# Patient Record
Sex: FEMALE | Race: WHITE | NOT HISPANIC OR LATINO | Employment: FULL TIME | URBAN - METROPOLITAN AREA
[De-identification: names, ages, dates, MRNs, and addresses within clinical notes are randomized per-mention and may not be internally consistent; named-entity substitution may affect disease eponyms.]

---

## 2023-10-13 ENCOUNTER — OFFICE VISIT (OUTPATIENT)
Dept: INTERNAL MEDICINE | Facility: CLINIC | Age: 38
End: 2023-10-13
Payer: COMMERCIAL

## 2023-10-13 ENCOUNTER — LAB VISIT (OUTPATIENT)
Dept: LAB | Facility: HOSPITAL | Age: 38
End: 2023-10-13
Attending: FAMILY MEDICINE
Payer: COMMERCIAL

## 2023-10-13 VITALS
HEART RATE: 108 BPM | DIASTOLIC BLOOD PRESSURE: 80 MMHG | BODY MASS INDEX: 21.87 KG/M2 | TEMPERATURE: 97 F | WEIGHT: 123.44 LBS | RESPIRATION RATE: 20 BRPM | SYSTOLIC BLOOD PRESSURE: 122 MMHG | HEIGHT: 63 IN | OXYGEN SATURATION: 99 %

## 2023-10-13 DIAGNOSIS — M25.511 ACUTE PAIN OF RIGHT SHOULDER: ICD-10-CM

## 2023-10-13 DIAGNOSIS — R25.3 MUSCLE TWITCHING: ICD-10-CM

## 2023-10-13 DIAGNOSIS — R42 DIZZINESS: Primary | ICD-10-CM

## 2023-10-13 LAB
ALBUMIN SERPL BCP-MCNC: 4.3 G/DL (ref 3.5–5.2)
ALP SERPL-CCNC: 48 U/L (ref 55–135)
ALT SERPL W/O P-5'-P-CCNC: 14 U/L (ref 10–44)
ANION GAP SERPL CALC-SCNC: 10 MMOL/L (ref 8–16)
AST SERPL-CCNC: 14 U/L (ref 10–40)
BILIRUB SERPL-MCNC: 0.4 MG/DL (ref 0.1–1)
BUN SERPL-MCNC: 10 MG/DL (ref 6–20)
CALCIUM SERPL-MCNC: 9.4 MG/DL (ref 8.7–10.5)
CHLORIDE SERPL-SCNC: 102 MMOL/L (ref 95–110)
CO2 SERPL-SCNC: 24 MMOL/L (ref 23–29)
CREAT SERPL-MCNC: 0.7 MG/DL (ref 0.5–1.4)
EST. GFR  (NO RACE VARIABLE): >60 ML/MIN/1.73 M^2
GLUCOSE SERPL-MCNC: 86 MG/DL (ref 70–110)
POTASSIUM SERPL-SCNC: 3.6 MMOL/L (ref 3.5–5.1)
PROT SERPL-MCNC: 7 G/DL (ref 6–8.4)
SODIUM SERPL-SCNC: 136 MMOL/L (ref 136–145)

## 2023-10-13 PROCEDURE — 36415 COLL VENOUS BLD VENIPUNCTURE: CPT | Mod: PO | Performed by: FAMILY MEDICINE

## 2023-10-13 PROCEDURE — 3008F BODY MASS INDEX DOCD: CPT | Mod: CPTII,S$GLB,, | Performed by: FAMILY MEDICINE

## 2023-10-13 PROCEDURE — 3079F DIAST BP 80-89 MM HG: CPT | Mod: CPTII,S$GLB,, | Performed by: FAMILY MEDICINE

## 2023-10-13 PROCEDURE — 99203 PR OFFICE/OUTPT VISIT, NEW, LEVL III, 30-44 MIN: ICD-10-PCS | Mod: S$GLB,,, | Performed by: FAMILY MEDICINE

## 2023-10-13 PROCEDURE — 99203 OFFICE O/P NEW LOW 30 MIN: CPT | Mod: S$GLB,,, | Performed by: FAMILY MEDICINE

## 2023-10-13 PROCEDURE — 3074F PR MOST RECENT SYSTOLIC BLOOD PRESSURE < 130 MM HG: ICD-10-PCS | Mod: CPTII,S$GLB,, | Performed by: FAMILY MEDICINE

## 2023-10-13 PROCEDURE — 3008F PR BODY MASS INDEX (BMI) DOCUMENTED: ICD-10-PCS | Mod: CPTII,S$GLB,, | Performed by: FAMILY MEDICINE

## 2023-10-13 PROCEDURE — 3074F SYST BP LT 130 MM HG: CPT | Mod: CPTII,S$GLB,, | Performed by: FAMILY MEDICINE

## 2023-10-13 PROCEDURE — 1159F PR MEDICATION LIST DOCUMENTED IN MEDICAL RECORD: ICD-10-PCS | Mod: CPTII,S$GLB,, | Performed by: FAMILY MEDICINE

## 2023-10-13 PROCEDURE — 99999 PR PBB SHADOW E&M-NEW PATIENT-LVL IV: CPT | Mod: PBBFAC,,, | Performed by: FAMILY MEDICINE

## 2023-10-13 PROCEDURE — 1159F MED LIST DOCD IN RCRD: CPT | Mod: CPTII,S$GLB,, | Performed by: FAMILY MEDICINE

## 2023-10-13 PROCEDURE — 99999 PR PBB SHADOW E&M-NEW PATIENT-LVL IV: ICD-10-PCS | Mod: PBBFAC,,, | Performed by: FAMILY MEDICINE

## 2023-10-13 PROCEDURE — 80053 COMPREHEN METABOLIC PANEL: CPT | Performed by: FAMILY MEDICINE

## 2023-10-13 PROCEDURE — 3079F PR MOST RECENT DIASTOLIC BLOOD PRESSURE 80-89 MM HG: ICD-10-PCS | Mod: CPTII,S$GLB,, | Performed by: FAMILY MEDICINE

## 2023-10-13 RX ORDER — BUPROPION HYDROCHLORIDE 300 MG/1
300 TABLET ORAL DAILY
COMMUNITY

## 2023-10-13 RX ORDER — THYROID 30 MG/1
TABLET ORAL NIGHTLY
COMMUNITY

## 2023-10-15 NOTE — PROGRESS NOTES
"Subjective:      Patient ID: Monse Sheridan is a 38 y.o. female.    Chief Complaint: Establish Care      Patient here to establish care.   Reviewed recent labs from earlier this year today with the patient.   She reports episode of sharp pain in right shoulder, no injury or trauma, lasted about 5 minutes then completely resolved.   She has had intermittent twitching in right lower face, this has only been about a week.   Reports also intermittent dizziness, often happens with movement of her head,denies spinning sensation but sometimes associated with nausea, sometimes happens when driving and she moves her head      Review of Systems   Constitutional:  Negative for activity change and appetite change.   Musculoskeletal:  Positive for arthralgias.   Neurological:  Positive for dizziness.     Past Medical History:   Diagnosis Date    Hypothyroidism           Past Surgical History:   Procedure Laterality Date    TONSILLECTOMY       Family History   Problem Relation Age of Onset    Asthma Brother     Diabetes Daughter     Stroke Paternal Grandmother      Social History     Socioeconomic History    Marital status:    Tobacco Use    Smoking status: Never    Smokeless tobacco: Never   Substance and Sexual Activity    Alcohol use: Yes    Drug use: Yes    Sexual activity: Yes     Partners: Male     Birth control/protection: Implant     Review of patient's allergies indicates:   Allergen Reactions    Latex Hives       Objective:       /80 (BP Location: Left arm, Patient Position: Sitting, BP Method: Medium (Manual))   Pulse 108   Temp 97.4 °F (36.3 °C) (Tympanic)   Resp 20   Ht 5' 3" (1.6 m)   Wt 56 kg (123 lb 7.3 oz)   SpO2 99%   BMI 21.87 kg/m²   Physical Exam  Constitutional:       General: She is not in acute distress.     Appearance: Normal appearance. She is well-developed. She is not ill-appearing or diaphoretic.   HENT:      Head: Normocephalic.      Right Ear: Tympanic membrane, ear canal and " external ear normal. There is no impacted cerumen.      Left Ear: Tympanic membrane, ear canal and external ear normal. There is no impacted cerumen.      Nose: No congestion.      Mouth/Throat:      Pharynx: No posterior oropharyngeal erythema.   Cardiovascular:      Rate and Rhythm: Normal rate and regular rhythm.      Heart sounds: Normal heart sounds.   Pulmonary:      Effort: Pulmonary effort is normal.      Breath sounds: Normal breath sounds.   Musculoskeletal:         General: No swelling or tenderness. Normal range of motion.   Neurological:      Mental Status: She is alert and oriented to person, place, and time.   Psychiatric:         Mood and Affect: Mood normal.         Behavior: Behavior normal.         Thought Content: Thought content normal.         Judgment: Judgment normal.       Assessment:     1. Dizziness    2. Acute pain of right shoulder    3. Muscle twitching      Plan:   Dizziness  -     Ambulatory referral/consult to Physical/Occupational Therapy; Future; Expected date: 10/20/2023    Acute pain of right shoulder  Comments:  currently resolved- lasted about 5 minutes    Muscle twitching  -     Comprehensive Metabolic Panel; Future; Expected date: 04/10/2024      Medication List with Changes/Refills   Current Medications    BUPROPION (WELLBUTRIN XL) 300 MG 24 HR TABLET    Take 300 mg by mouth once daily.    THYROID, PORK, (ARMOUR THYROID) 30 MG TAB    Take by mouth nightly.

## 2023-11-08 ENCOUNTER — OFFICE VISIT (OUTPATIENT)
Dept: OTOLARYNGOLOGY | Facility: CLINIC | Age: 38
End: 2023-11-08
Payer: COMMERCIAL

## 2023-11-08 VITALS — WEIGHT: 123.44 LBS | BODY MASS INDEX: 21.87 KG/M2

## 2023-11-08 DIAGNOSIS — R42 DIZZINESS: Primary | ICD-10-CM

## 2023-11-08 PROCEDURE — 3008F PR BODY MASS INDEX (BMI) DOCUMENTED: ICD-10-PCS | Mod: CPTII,S$GLB,, | Performed by: ORTHOPAEDIC SURGERY

## 2023-11-08 PROCEDURE — 1159F MED LIST DOCD IN RCRD: CPT | Mod: CPTII,S$GLB,, | Performed by: ORTHOPAEDIC SURGERY

## 2023-11-08 PROCEDURE — 3008F BODY MASS INDEX DOCD: CPT | Mod: CPTII,S$GLB,, | Performed by: ORTHOPAEDIC SURGERY

## 2023-11-08 PROCEDURE — 99999 PR PBB SHADOW E&M-EST. PATIENT-LVL III: CPT | Mod: PBBFAC,,, | Performed by: ORTHOPAEDIC SURGERY

## 2023-11-08 PROCEDURE — 99999 PR PBB SHADOW E&M-EST. PATIENT-LVL III: ICD-10-PCS | Mod: PBBFAC,,, | Performed by: ORTHOPAEDIC SURGERY

## 2023-11-08 PROCEDURE — 99204 PR OFFICE/OUTPT VISIT, NEW, LEVL IV, 45-59 MIN: ICD-10-PCS | Mod: S$GLB,,, | Performed by: ORTHOPAEDIC SURGERY

## 2023-11-08 PROCEDURE — 99204 OFFICE O/P NEW MOD 45 MIN: CPT | Mod: S$GLB,,, | Performed by: ORTHOPAEDIC SURGERY

## 2023-11-08 PROCEDURE — 1159F PR MEDICATION LIST DOCUMENTED IN MEDICAL RECORD: ICD-10-PCS | Mod: CPTII,S$GLB,, | Performed by: ORTHOPAEDIC SURGERY

## 2023-11-08 NOTE — PATIENT INSTRUCTIONS
VOR x 1   The Vestibular Ocular Reflex (VOR) is used to keep images still when the head is moving. This reflex starts with a signal from the inner ear (vestibular system) which informs the eyes of head movement. The eyes then make the necessary movement to keep the visual environment still when the head is in motion. If this reflex is not working (when there is damage to the inner ear), then the image will not stay still and you may feel that things look blurry or that they are moving when you move your head. This causes dizziness and imbalance when performing head movements during everyday tasks such as walking, household chores, and driving. The following exercise is used to correct this reflex and decrease dizziness.    Instructions:  Sit or stand facing a target (i.e.-a letter written on a post-it note)  It is recommended that you vary your distance from the target in order to train the reflex to a variety of situations.  Turn your head back and forth (similar to shaking your head no) while looking at the letter and keeping the letter IN FOCUS.   Move your head as fast as you can while still able to focus on the letter (speed is more important than how far you move your head)  Start with 30 seconds (use a timer)    Rest until all symptoms have returned to baseline then repeat with up and down (shaking your head yes)  Once you are able to tolerate 30 seconds, then add 10-15 seconds each time until you are able to complete the exercise for 2 minutes  Once you have achieved 2 minutes, then continue performing for this duration of time  Perform 3 times/day unless instructed otherwise    Reminders:  -Dont forget to blink!  -This exercise will likely make you dizzier, this is normal. Just be sure that you allow yourself time to rest before returning to any activities.  -Your neck may become sore. You can try gentle stretching or a hot pack. Speak to your therapist for specific instructions if this soreness lasts  for several days.  -If you generally wear glasses, then wear your glasses for this exercise. If you have glasses with progressive lenses (bi-focals) then do not wear them for the up and down exercise if you move through the progression, as the exercise will not be effective.

## 2023-11-08 NOTE — PROGRESS NOTES
Subjective:      Patient ID: Monse Sheridan is a 38 y.o. female.    Chief Complaint: Dizziness (Dizziness started 6 mos ago and has been getting worse over time , off balance when she turns her ehad or move a certain way , has nausea and headaches . Has no ringing in ears . Went seen PT , DR. Joyner @ [facility] , should be faxing over test results )    Patient is a very pleasant 38 y.o. female here to see me today for the first time for evaluation of dizziness.   She reports that the symptoms have been present for the last 6 months.   She describes the dizziness as a sensation of unsteadiness and wooziness and says that it lasts as long as the activity lasts.  She has noted that driving, going up and down stairs, heights acts as a trigger.  She denies aural pressure, otalgia, otorrhea, tinnitus, and hearing loss.  She has not started any new medications, and has not had any recent dietary changes.          Review of Systems   Neurological:  Positive for dizziness and light-headedness. Negative for headaches.       Objective:       Physical Exam  Constitutional:       General: She is not in acute distress.     Appearance: She is well-developed.   HENT:      Head: Normocephalic and atraumatic.      Right Ear: Tympanic membrane, ear canal and external ear normal.      Left Ear: Tympanic membrane, ear canal and external ear normal.      Nose: Nose normal. No nasal deformity, septal deviation, mucosal edema or rhinorrhea.      Right Sinus: No maxillary sinus tenderness or frontal sinus tenderness.      Left Sinus: No maxillary sinus tenderness or frontal sinus tenderness.      Mouth/Throat:      Mouth: Mucous membranes are not pale and not dry.      Dentition: No dental caries.      Pharynx: Uvula midline. No oropharyngeal exudate or posterior oropharyngeal erythema.   Eyes:      General: Lids are normal. No scleral icterus.     Extraocular Movements:      Right eye: Normal extraocular motion and no nystagmus.      Left  eye: Normal extraocular motion and no nystagmus.      Conjunctiva/sclera: Conjunctivae normal.      Right eye: Right conjunctiva is not injected. No chemosis.     Left eye: Left conjunctiva is not injected. No chemosis.     Pupils: Pupils are equal, round, and reactive to light.   Neck:      Thyroid: No thyroid mass or thyromegaly.      Trachea: Trachea and phonation normal. No tracheal tenderness or tracheal deviation.   Pulmonary:      Effort: Pulmonary effort is normal. No respiratory distress.      Breath sounds: No stridor.   Abdominal:      General: There is no distension.   Lymphadenopathy:      Head:      Right side of head: No submental, submandibular, preauricular, posterior auricular or occipital adenopathy.      Left side of head: No submental, submandibular, preauricular, posterior auricular or occipital adenopathy.      Cervical: No cervical adenopathy.   Skin:     General: Skin is warm and dry.      Findings: No erythema or rash.   Neurological:      Mental Status: She is alert and oriented to person, place, and time.      Cranial Nerves: No cranial nerve deficit.   Psychiatric:         Behavior: Behavior normal.     Reviewed documentation from PT.    Assessment:       1. Dizziness        Plan:     Dizziness    Her symptoms are most consistent with VOR issues, handout given with possible exercise regimen.  Continue to work with vestibular PT.  Will order VNG and audiogram, will call with results.

## 2023-11-24 ENCOUNTER — CLINICAL SUPPORT (OUTPATIENT)
Dept: AUDIOLOGY | Facility: CLINIC | Age: 38
End: 2023-11-24
Payer: COMMERCIAL

## 2023-11-24 DIAGNOSIS — R42 DIZZINESS: Primary | ICD-10-CM

## 2023-11-24 PROCEDURE — 92540 PR VESTIBULAR EVAL NYSTAG FOVL&PERPH STIM OSCIL TRACKING: ICD-10-PCS | Mod: S$GLB,,, | Performed by: AUDIOLOGIST-HEARING AID FITTER

## 2023-11-24 PROCEDURE — 99999 PR PBB SHADOW E&M-EST. PATIENT-LVL I: CPT | Mod: PBBFAC,,, | Performed by: AUDIOLOGIST-HEARING AID FITTER

## 2023-11-24 PROCEDURE — 92540 BASIC VESTIBULAR EVALUATION: CPT | Mod: S$GLB,,, | Performed by: AUDIOLOGIST-HEARING AID FITTER

## 2023-11-24 PROCEDURE — 92557 PR COMPREHENSIVE HEARING TEST: ICD-10-PCS | Mod: S$GLB,,, | Performed by: AUDIOLOGIST-HEARING AID FITTER

## 2023-11-24 PROCEDURE — 92567 PR TYMPA2METRY: ICD-10-PCS | Mod: S$GLB,,, | Performed by: AUDIOLOGIST-HEARING AID FITTER

## 2023-11-24 PROCEDURE — 92537 CALORIC VSTBLR TEST W/REC: CPT | Mod: S$GLB,,, | Performed by: AUDIOLOGIST-HEARING AID FITTER

## 2023-11-24 PROCEDURE — 92537 PR CALORIC VSTBLR TEST W/REC BITHERMAL: ICD-10-PCS | Mod: S$GLB,,, | Performed by: AUDIOLOGIST-HEARING AID FITTER

## 2023-11-24 PROCEDURE — 92557 COMPREHENSIVE HEARING TEST: CPT | Mod: S$GLB,,, | Performed by: AUDIOLOGIST-HEARING AID FITTER

## 2023-11-24 PROCEDURE — 99999 PR PBB SHADOW E&M-EST. PATIENT-LVL I: ICD-10-PCS | Mod: PBBFAC,,, | Performed by: AUDIOLOGIST-HEARING AID FITTER

## 2023-11-24 PROCEDURE — 92567 TYMPANOMETRY: CPT | Mod: S$GLB,,, | Performed by: AUDIOLOGIST-HEARING AID FITTER

## 2023-11-24 NOTE — Clinical Note
Please review and f/u your plan. I question if there could be a migraine component.  Advised pt to continue VRT until dismissed. Maybe neuro eval if not improving? Please have staff send Ar Alexander at [facility] my report after your review. Thanks!

## 2023-11-24 NOTE — PROGRESS NOTES
Referring provider: Dr. Jerrell Shea SERGO Sheridan was seen 11/24/2023 for an audiological and vestibular evaluation.  Patient complains of dizziness that has gradually worsened over time. She describes off-balance, unsteady disoriented feeling or tilting sensation. She thinks she leans more so to the right. Duration from 30 seconds when moving head. She stays carsick as long as she rides in car, onset during childhood and progressively worsened during adulthood. No illusion of spinning. Symptoms primarily occur with movement, such as turning head when driving or riding car, walking up or down stairs or walking down hallway. Has been attending VRT with [facility] for the past three weeks. Describes VOR exercises and repositioning. She was given home exercises last week; has not consistently performed daily. She has appreciated some improvement. No hearing loss or changes in hearing. She reports aural pressure at times with dizzy, right ear greater than left. Has history of headache; no migraine history.     Audiology Report:  Results reveal normal hearing 250-8000 Hz for the right ear, and normal hearing 250-8000 Hz for the left ear.  Speech Reception Thresholds were 5 dBHL for the right ear and 5 dBHL for the left ear.   Word recognition scores were excellent for the right ear and excellent for the left ear.   Tympanograms were Type A for the right ear and Type A for the left ear.    Videonystagmography Report (VNG):  Note: Patient discontinued Wellbutrin for 24 hours prior to exam.   Oculomotor function tests:  Sinusoidal tracking was normal with normal gain at all test frequencies.   Saccade revealed normal velocities, normal accuracies and essentially normal latencies. There were few trials of prolonged saccadic latency recordings.  Optokinetic were normal and symmetric.  Gaze test was absent for nystagmus.  Spontaneous test was absent for nystagmus.  Head-shake test was absent for after head-shake  nystagmus.  Static Positional test was absent for nystagmus.   [place]-Hallpike Right revealed 2 d/s right-beating nystagmus with normal fixation suppression. There appeared to be a slight upward torsional component for a few beats (vision denied only), not consistently present and an atypical pattern for BPPV.    [place]-Hallpike Left was revealed 2-3 d/s up-beating nystagmus with normal fixation suppression.   Bi-thermal caloric test was Normal.  Fixation suppression following caloric irrigations was normal.  The following values were obtained:  Unilateral weakness (UW): 8% right ear  Directional preponderance (DP): 3% right beating  RC: 20 d/s   d/s  RW: 14 d/s  LW: 16 d/s    Summary: Essentially normal VNG. No caloric weakness or asymmetry. There was <clinically insignificant> non-localizing nystagmus to head-hanging that was not consistent with typical pattern for BPPV.  Oculomotor tests were normal. There were a few trials of prolonged saccadic latencies. These findings may suggest central involvement.     Recommendations:  ENT review  Continue with VRT    Patient was counseled on the above findings.  Tracings are to be scanned.

## 2023-11-27 ENCOUNTER — TELEPHONE (OUTPATIENT)
Dept: OTOLARYNGOLOGY | Facility: CLINIC | Age: 38
End: 2023-11-27
Payer: COMMERCIAL

## 2023-11-27 NOTE — TELEPHONE ENCOUNTER
Agree with plan- please let patient know that I agree with Reem's evaluation, and would consider a consult with neurology if she is not improving.  Please also send a copy of VNG/audio to her outside physical therapist, Ar Vaca at [facility].  Thanks.

## 2023-11-27 NOTE — TELEPHONE ENCOUNTER
----- Message from Abdirahman Herrera, CCC-A sent at 11/24/2023 11:31 AM CST -----  Please review and f/u your plan. I question if there could be a migraine component.   Advised pt to continue VRT until dismissed. Maybe neuro eval if not improving? Please have staff send Ar [facility] at [facility] my report after your review. Thanks!

## 2024-07-25 ENCOUNTER — LAB VISIT (OUTPATIENT)
Dept: LAB | Facility: HOSPITAL | Age: 39
End: 2024-07-25
Attending: FAMILY MEDICINE
Payer: COMMERCIAL

## 2024-07-25 ENCOUNTER — OFFICE VISIT (OUTPATIENT)
Dept: INTERNAL MEDICINE | Facility: CLINIC | Age: 39
End: 2024-07-25
Payer: COMMERCIAL

## 2024-07-25 VITALS
OXYGEN SATURATION: 98 % | BODY MASS INDEX: 22.46 KG/M2 | HEIGHT: 63 IN | WEIGHT: 126.75 LBS | TEMPERATURE: 97 F | HEART RATE: 85 BPM | SYSTOLIC BLOOD PRESSURE: 110 MMHG | DIASTOLIC BLOOD PRESSURE: 76 MMHG

## 2024-07-25 DIAGNOSIS — Z00.00 ROUTINE ADULT HEALTH MAINTENANCE: ICD-10-CM

## 2024-07-25 DIAGNOSIS — Z00.00 ROUTINE ADULT HEALTH MAINTENANCE: Primary | ICD-10-CM

## 2024-07-25 DIAGNOSIS — E03.9 ACQUIRED HYPOTHYROIDISM: ICD-10-CM

## 2024-07-25 DIAGNOSIS — F32.5 MAJOR DEPRESSIVE DISORDER WITH SINGLE EPISODE, IN FULL REMISSION: ICD-10-CM

## 2024-07-25 LAB
ALBUMIN SERPL BCP-MCNC: 4.2 G/DL (ref 3.5–5.2)
ALP SERPL-CCNC: 46 U/L (ref 55–135)
ALT SERPL W/O P-5'-P-CCNC: 16 U/L (ref 10–44)
ANION GAP SERPL CALC-SCNC: 5 MMOL/L (ref 8–16)
AST SERPL-CCNC: 14 U/L (ref 10–40)
BASOPHILS # BLD AUTO: 0.08 K/UL (ref 0–0.2)
BASOPHILS NFR BLD: 1.2 % (ref 0–1.9)
BILIRUB SERPL-MCNC: 0.5 MG/DL (ref 0.1–1)
BUN SERPL-MCNC: 9 MG/DL (ref 6–20)
CALCIUM SERPL-MCNC: 9.8 MG/DL (ref 8.7–10.5)
CHLORIDE SERPL-SCNC: 107 MMOL/L (ref 95–110)
CHOLEST SERPL-MCNC: 191 MG/DL (ref 120–199)
CHOLEST/HDLC SERPL: 3.2 {RATIO} (ref 2–5)
CO2 SERPL-SCNC: 26 MMOL/L (ref 23–29)
CREAT SERPL-MCNC: 0.9 MG/DL (ref 0.5–1.4)
DIFFERENTIAL METHOD BLD: NORMAL
EOSINOPHIL # BLD AUTO: 0.2 K/UL (ref 0–0.5)
EOSINOPHIL NFR BLD: 3 % (ref 0–8)
ERYTHROCYTE [DISTWIDTH] IN BLOOD BY AUTOMATED COUNT: 13.1 % (ref 11.5–14.5)
EST. GFR  (NO RACE VARIABLE): >60 ML/MIN/1.73 M^2
GLUCOSE SERPL-MCNC: 81 MG/DL (ref 70–110)
HCT VFR BLD AUTO: 41.4 % (ref 37–48.5)
HDLC SERPL-MCNC: 60 MG/DL (ref 40–75)
HDLC SERPL: 31.4 % (ref 20–50)
HGB BLD-MCNC: 13.8 G/DL (ref 12–16)
IMM GRANULOCYTES # BLD AUTO: 0.02 K/UL (ref 0–0.04)
IMM GRANULOCYTES NFR BLD AUTO: 0.3 % (ref 0–0.5)
LDLC SERPL CALC-MCNC: 121 MG/DL (ref 63–159)
LYMPHOCYTES # BLD AUTO: 1.7 K/UL (ref 1–4.8)
LYMPHOCYTES NFR BLD: 26.2 % (ref 18–48)
MCH RBC QN AUTO: 29.5 PG (ref 27–31)
MCHC RBC AUTO-ENTMCNC: 33.3 G/DL (ref 32–36)
MCV RBC AUTO: 89 FL (ref 82–98)
MONOCYTES # BLD AUTO: 0.5 K/UL (ref 0.3–1)
MONOCYTES NFR BLD: 8.3 % (ref 4–15)
NEUTROPHILS # BLD AUTO: 3.9 K/UL (ref 1.8–7.7)
NEUTROPHILS NFR BLD: 61 % (ref 38–73)
NONHDLC SERPL-MCNC: 131 MG/DL
NRBC BLD-RTO: 0 /100 WBC
PLATELET # BLD AUTO: 220 K/UL (ref 150–450)
PMV BLD AUTO: 11.1 FL (ref 9.2–12.9)
POTASSIUM SERPL-SCNC: 4.2 MMOL/L (ref 3.5–5.1)
PROT SERPL-MCNC: 6.9 G/DL (ref 6–8.4)
RBC # BLD AUTO: 4.68 M/UL (ref 4–5.4)
SODIUM SERPL-SCNC: 138 MMOL/L (ref 136–145)
TRIGL SERPL-MCNC: 50 MG/DL (ref 30–150)
WBC # BLD AUTO: 6.41 K/UL (ref 3.9–12.7)

## 2024-07-25 PROCEDURE — 1159F MED LIST DOCD IN RCRD: CPT | Mod: CPTII,S$GLB,, | Performed by: FAMILY MEDICINE

## 2024-07-25 PROCEDURE — 80061 LIPID PANEL: CPT | Performed by: FAMILY MEDICINE

## 2024-07-25 PROCEDURE — 3008F BODY MASS INDEX DOCD: CPT | Mod: CPTII,S$GLB,, | Performed by: FAMILY MEDICINE

## 2024-07-25 PROCEDURE — 3074F SYST BP LT 130 MM HG: CPT | Mod: CPTII,S$GLB,, | Performed by: FAMILY MEDICINE

## 2024-07-25 PROCEDURE — 3078F DIAST BP <80 MM HG: CPT | Mod: CPTII,S$GLB,, | Performed by: FAMILY MEDICINE

## 2024-07-25 PROCEDURE — 85025 COMPLETE CBC W/AUTO DIFF WBC: CPT | Performed by: FAMILY MEDICINE

## 2024-07-25 PROCEDURE — 99999 PR PBB SHADOW E&M-EST. PATIENT-LVL III: CPT | Mod: PBBFAC,,, | Performed by: FAMILY MEDICINE

## 2024-07-25 PROCEDURE — 80323 ALKALOIDS NOS: CPT | Performed by: FAMILY MEDICINE

## 2024-07-25 PROCEDURE — 80053 COMPREHEN METABOLIC PANEL: CPT | Performed by: FAMILY MEDICINE

## 2024-07-25 PROCEDURE — 99395 PREV VISIT EST AGE 18-39: CPT | Mod: S$GLB,,, | Performed by: FAMILY MEDICINE

## 2024-07-25 RX ORDER — BUPROPION HYDROCHLORIDE 150 MG/1
450 TABLET ORAL DAILY
Qty: 90 TABLET | Refills: 11 | Status: SHIPPED | OUTPATIENT
Start: 2024-07-25 | End: 2025-07-25

## 2024-07-25 NOTE — PROGRESS NOTES
Subjective:      Patient ID: Monse Sheridan is a 39 y.o. female.    Chief Complaint: Annual Exam      Patient here for routine annual wellness exam.  She is generally in good health, has been feeling well.  Has been on Wellbutrin for some time, reports increased stressors recently, would like to increase the dose.  She sees her gynecologist Dr. Gretchen Rojas regularly for well woman exams.      Review of Systems   Constitutional:  Negative for activity change, appetite change and unexpected weight change.   Respiratory:  Negative for shortness of breath.    Cardiovascular:  Negative for leg swelling.   Gastrointestinal:  Negative for abdominal pain.     Past Medical History:   Diagnosis Date    Hypothyroidism           Past Surgical History:   Procedure Laterality Date    TONSILLECTOMY       Family History   Problem Relation Name Age of Onset    Asthma Brother      Diabetes Daughter      Stroke Paternal Grandmother       Social History     Socioeconomic History    Marital status:    Tobacco Use    Smoking status: Never    Smokeless tobacco: Never   Substance and Sexual Activity    Alcohol use: Yes    Drug use: Yes    Sexual activity: Yes     Partners: Male     Birth control/protection: Implant     Social Determinants of Health      Received from [facility] and Its Subsidiaries and Affiliates    Overall Financial Resource Strain (CARDIA)    Received from [facility] and Its Subsidiaries and Affiliates    Hunger Vital Sign    Received from [facility] and Its Subsidiaries and Affiliates    PRAPARE - Transportation    Received from [facility] and Its Subsidiaries and Affiliates    Exercise Vital Sign    Received from [facility] and Its Subsidiaries and Affiliates    [employer] - Occupational  "Stress Questionnaire    Received from [facility] and Its Subsidiaries and Affiliates    Housing Stability Vital Sign     Review of patient's allergies indicates:   Allergen Reactions    Latex Hives       Objective:       /76 (BP Location: Left arm, Patient Position: Sitting, BP Method: Medium (Manual))   Pulse 85   Temp 96.8 °F (36 °C) (Tympanic)   Ht 5' 3" (1.6 m)   Wt 57.5 kg (126 lb 12.2 oz)   SpO2 98%   BMI 22.46 kg/m²   Physical Exam  Vitals reviewed.   Constitutional:       General: She is not in acute distress.     Appearance: Normal appearance. She is well-developed. She is not ill-appearing or diaphoretic.   HENT:      Head: Normocephalic and atraumatic.      Right Ear: Hearing, tympanic membrane, ear canal and external ear normal.      Left Ear: Hearing, tympanic membrane, ear canal and external ear normal.      Nose: Nose normal.      Mouth/Throat:      Pharynx: Uvula midline. No oropharyngeal exudate.   Eyes:      Conjunctiva/sclera: Conjunctivae normal.      Pupils: Pupils are equal, round, and reactive to light.   Neck:      Thyroid: No thyromegaly.      Trachea: No tracheal deviation.   Cardiovascular:      Rate and Rhythm: Normal rate and regular rhythm.      Heart sounds: Normal heart sounds. No murmur heard.  Pulmonary:      Effort: Pulmonary effort is normal. No respiratory distress.      Breath sounds: Normal breath sounds.   Abdominal:      General: Bowel sounds are normal.      Palpations: Abdomen is soft.      Tenderness: There is no abdominal tenderness. There is no guarding.      Hernia: No hernia is present.   Musculoskeletal:         General: Normal range of motion.      Cervical back: Normal range of motion and neck supple.      Right lower leg: No edema.      Left lower leg: No edema.   Lymphadenopathy:      Cervical: No cervical adenopathy.   Skin:     General: Skin is warm and dry.      Capillary Refill: Capillary refill takes less " than 2 seconds.   Neurological:      General: No focal deficit present.      Mental Status: She is alert and oriented to person, place, and time.   Psychiatric:         Mood and Affect: Mood normal.         Behavior: Behavior normal.         Thought Content: Thought content normal.         Judgment: Judgment normal.       Assessment:     1. Routine adult health maintenance    2. Major depressive disorder with single episode, in full remission    3. Acquired hypothyroidism      Plan:   Routine adult health maintenance  -     CBC Auto Differential; Future; Expected date: 07/25/2024  -     Comprehensive Metabolic Panel; Future; Expected date: 07/25/2024  -     Lipid Panel; Future; Expected date: 07/25/2024  -     NICOTINE AND METABOLITES, BLOOD; Future; Expected date: 07/25/2024    Major depressive disorder with single episode, in full remission    Acquired hypothyroidism    Other orders  -     buPROPion (WELLBUTRIN XL) 150 MG TB24 tablet; Take 3 tablets (450 mg total) by mouth once daily.  Dispense: 90 tablet; Refill: 11      Medication List with Changes/Refills   New Medications    BUPROPION (WELLBUTRIN XL) 150 MG TB24 TABLET    Take 3 tablets (450 mg total) by mouth once daily.   Current Medications    THYROID, PORK, (ARMOUR THYROID) 30 MG TAB    Take by mouth nightly.   Discontinued Medications    BUPROPION (WELLBUTRIN XL) 300 MG 24 HR TABLET    Take 300 mg by mouth once daily.

## 2024-07-29 LAB
COTININE SERPL-MCNC: <3 NG/ML
NICOTINE SERPL-MCNC: <3 NG/ML

## 2024-09-24 ENCOUNTER — OFFICE VISIT (OUTPATIENT)
Dept: INTERNAL MEDICINE | Facility: CLINIC | Age: 39
End: 2024-09-24
Payer: COMMERCIAL

## 2024-09-24 VITALS
SYSTOLIC BLOOD PRESSURE: 120 MMHG | OXYGEN SATURATION: 98 % | HEART RATE: 85 BPM | DIASTOLIC BLOOD PRESSURE: 68 MMHG | BODY MASS INDEX: 22.81 KG/M2 | HEIGHT: 63 IN | WEIGHT: 128.75 LBS | TEMPERATURE: 96 F

## 2024-09-24 DIAGNOSIS — R06.02 SOB (SHORTNESS OF BREATH): Primary | ICD-10-CM

## 2024-09-24 PROCEDURE — 3074F SYST BP LT 130 MM HG: CPT | Mod: CPTII,S$GLB,, | Performed by: FAMILY MEDICINE

## 2024-09-24 PROCEDURE — 1159F MED LIST DOCD IN RCRD: CPT | Mod: CPTII,S$GLB,, | Performed by: FAMILY MEDICINE

## 2024-09-24 PROCEDURE — 99999 PR PBB SHADOW E&M-EST. PATIENT-LVL III: CPT | Mod: PBBFAC,,, | Performed by: FAMILY MEDICINE

## 2024-09-24 PROCEDURE — G2211 COMPLEX E/M VISIT ADD ON: HCPCS | Mod: S$GLB,,, | Performed by: FAMILY MEDICINE

## 2024-09-24 PROCEDURE — 99213 OFFICE O/P EST LOW 20 MIN: CPT | Mod: S$GLB,,, | Performed by: FAMILY MEDICINE

## 2024-09-24 PROCEDURE — 3008F BODY MASS INDEX DOCD: CPT | Mod: CPTII,S$GLB,, | Performed by: FAMILY MEDICINE

## 2024-09-24 PROCEDURE — 3078F DIAST BP <80 MM HG: CPT | Mod: CPTII,S$GLB,, | Performed by: FAMILY MEDICINE

## 2024-09-24 NOTE — PROGRESS NOTES
Subjective:      Patient ID: Monse Sheridan is a 39 y.o. female.    Chief Complaint: Medication Management      History of Present Illness    CHIEF COMPLAINT:  Shortness of breath -now improved    MEDICATION AND SIDE EFFECTS:  She increased her Wellbutrin dosage to 450 mg approximately 1-2 months ago. Initially, she experienced positive effects for 1-2 weeks, but subsequently developed anxiety-like symptoms, including shortness of breath and difficulty taking deep breaths occurring daily. She also noted unusually high blood pressure readings on her home monitor. Due to these symptoms, she gradually reduced her Wellbutrin dosage and has completely discontinued it for about a week. Since stopping the medication, she reports improvement in her breathing symptoms. However, she notes decreased motivation and organization without the medication. She denies any increased stress or other contributing factors during this time period.    RESPIRATORY SYMPTOMS:  She experienced difficulty taking deep breaths and feeling of not getting enough air, persistent throughout the day and causing significant stress. She also experienced nighttime breathing difficulties, waking up feeling unable to breathe on a few occasions. She denies any concurrent congestion, nasal symptoms, or other illnesses during this time. She reports only mild spring allergies.    CARDIOVASCULAR:  She noted occasional irregular heartbeat, detected by a home blood pressure monitor. She reports a previous cardiology evaluation with normal results.    THYROID MANAGEMENT:  She undergoes regular thyroid level checks every 3-6 months, with the most recent check approximately 3 months ago. She is currently using [facility] Thyroid medication for thyroid management. She denies any current thyroid-related symptoms or concerns.      ROS:  Cardiovascular: -chest pain, +palpitations, -lower extremity edema  Respiratory: -cough, +shortness of breath  Psychiatric: -anxiety,  "-depression, +sleep difficulty        Past Medical History:   Diagnosis Date    Hypothyroidism           Past Surgical History:   Procedure Laterality Date    TONSILLECTOMY       Family History   Problem Relation Name Age of Onset    Asthma Brother      Diabetes Daughter      Stroke Paternal Grandmother       Social History     Socioeconomic History    Marital status:    Tobacco Use    Smoking status: Never    Smokeless tobacco: Never   Substance and Sexual Activity    Alcohol use: Yes    Drug use: Yes    Sexual activity: Yes     Partners: Male     Birth control/protection: Implant     Social Determinants of Health      Received from [facility] and Its Subsidiaries and Affiliates    Overall Financial Resource Strain (CARDIA)    Received from [facility] Subsidiaries and Affiliates    Hunger Vital Sign    Received from [facility] Its Subsidiaries and Affiliates    PRAPARE - Transportation    Received from [facility] and Its Subsidiaries and Affiliates    Exercise Vital Sign    Received from [facility] and Its Subsidiaries and Affiliates    [employer] - Occupational Stress Questionnaire    Received from [facility] and Its Subsidiaries and Affiliates    Housing Stability Vital Sign     Review of patient's allergies indicates:   Allergen Reactions    Latex Hives       Objective:       /68 (BP Location: Left arm, Patient Position: Sitting, BP Method: Large (Automatic))   Pulse 85   Temp 96.2 °F (35.7 °C) (Tympanic)   Ht 5' 3" (1.6 m)   Wt 58.4 kg (128 lb 12 oz)   SpO2 98%   BMI 22.81 kg/m²   Physical Exam    Lungs: Normal. Normal breath sounds.        Physical Exam  Constitutional:       General: She is not in acute distress.     " Appearance: Normal appearance. She is well-developed. She is not ill-appearing or diaphoretic.   Cardiovascular:      Rate and Rhythm: Normal rate and regular rhythm.      Heart sounds: Normal heart sounds.   Pulmonary:      Effort: Pulmonary effort is normal.      Breath sounds: Normal breath sounds. No wheezing or rhonchi.   Neurological:      Mental Status: She is alert and oriented to person, place, and time.   Psychiatric:         Mood and Affect: Mood normal.         Behavior: Behavior normal.         Thought Content: Thought content normal.         Judgment: Judgment normal.         Assessment:     1. SOB (shortness of breath)      Plan:   Assessment & Plan    ANXIETY:  - Considered anxiety as primary cause of patient's shortness of breath, given resolution of symptoms after discontinuing Wellbutrin.    ADULT-ONSET ASTHMA:  - Evaluated possibility of adult-onset asthma, though unable to confirm due to current symptom resolution.  - Explained possibility of adult-onset asthma and its unpredictable nature.    THYROID DISORDER:  - Assessed potential link between thyroid medication ([facility] Thyroid) and symptoms, noting variability in dosage.  - Discussed variability in [facility] Thyroid dosage due to its composition of ground animal thyroid gland.  - Request thyroid level check during appointment with Dr. Jain.    SHORTNESS OF BREATH:  - Ruled out need for immediate diagnostic testing given symptom improvement.  - Informed about potential cardiac causes of shortness of breath, such as premature beats or irregular rhythm.  - Ms. Sheridan to watch for recurrence of shortness of breath symptoms.  - Contact the office if shortness of breath symptoms return, to consider lung function testing.    MEDICATIONS/SUPPLEMENTS:  - Reviewed patient's prior tolerance of Wellbutrin at 300mg dose.  - Discontinued Wellbutrin 450mg.  - If patient chooses to restart Wellbutrin: start Wellbutrin 150mg daily for 1-2 weeks, then increase to  Wellbutrin 300mg daily after initial period.    FOLLOW UP:  - Follow up with Dr. Gretchen Jain at [facility] as scheduled.    Portions of this note were generated by [employer]il[employer].        SOB (shortness of breath)  Comments:  improved      Medication List with Changes/Refills   Current Medications    BUPROPION (WELLBUTRIN XL) 150 MG TB24 TABLET    Take 3 tablets (450 mg total) by mouth once daily.    THYROID, PORK, (ARMOUR THYROID) 30 MG TAB    Take by mouth nightly.       This note was generated with the assistance of ambient listening technology. Verbal consent was obtained by the patient and accompanying visitor(s) for the recording of patient appointment to facilitate this note. I attest to having reviewed and edited the generated note for accuracy, though some syntax or spelling errors may persist. Please contact the author of this note for any clarification.